# Patient Record
Sex: MALE | Race: WHITE | Employment: FULL TIME | URBAN - METROPOLITAN AREA
[De-identification: names, ages, dates, MRNs, and addresses within clinical notes are randomized per-mention and may not be internally consistent; named-entity substitution may affect disease eponyms.]

---

## 2020-12-16 ENCOUNTER — TELEPHONE (OUTPATIENT)
Dept: UROLOGY | Facility: CLINIC | Age: 40
End: 2020-12-16

## 2020-12-16 NOTE — TELEPHONE ENCOUNTER
[facility] Call Center    Phone Message    May a detailed message be left on voicemail: yes     Reason for Call: Other: Urethral stricture per new Pt. Transfer of care. Records with MARCELA Rodriges in [facility]. Pt unable to do video. Request in-clinic as pt is new to the state and would like a meet and greet anyway. Please contact Pt to discuss     Action Taken: Message routed to:  [facility] ([facility]): Uro    Travel Screening: Not Applicable

## 2020-12-18 NOTE — TELEPHONE ENCOUNTER
Sent to scheduling  He does not want a treatment for his self cathing twice per month for stricture Wilma Bartlett LPN Staff Nurse

## 2020-12-18 NOTE — TELEPHONE ENCOUNTER
Spoke with patient and he states no issues with cathing self to keep child almeida stricture open wants to discuss prostate burning and and start with urologist for psa checks due to family history of prostate cancer . Wilma Bartlett, CELIAN Staff Nurse

## 2020-12-18 NOTE — TELEPHONE ENCOUNTER
[facility] Call Center    Phone Message    May a detailed message be left on voicemail: yes     Reason for Call: Other: Patient called back to see if there were any opening for Dr. Slade. Please call Damion to discuss scheduling options.      Action Taken: Other:  Urology    Travel Screening: Not Applicable